# Patient Record
Sex: MALE | Race: WHITE | URBAN - METROPOLITAN AREA
[De-identification: names, ages, dates, MRNs, and addresses within clinical notes are randomized per-mention and may not be internally consistent; named-entity substitution may affect disease eponyms.]

---

## 2018-06-08 ENCOUNTER — OFFICE VISIT CONVERTED (OUTPATIENT)
Dept: UROLOGY | Facility: CLINIC | Age: 48
End: 2018-06-08
Attending: UROLOGY

## 2019-04-01 ENCOUNTER — HOSPITAL ENCOUNTER (OUTPATIENT)
Dept: URGENT CARE | Facility: CLINIC | Age: 49
Discharge: HOME | End: 2019-04-01

## 2019-08-20 ENCOUNTER — OFFICE VISIT CONVERTED (OUTPATIENT)
Dept: ORTHOPEDIC SURGERY | Facility: CLINIC | Age: 49
End: 2019-08-20
Attending: ORTHOPAEDIC SURGERY

## 2019-09-06 ENCOUNTER — OFFICE VISIT CONVERTED (OUTPATIENT)
Dept: ORTHOPEDIC SURGERY | Facility: CLINIC | Age: 49
End: 2019-09-06
Attending: ORTHOPAEDIC SURGERY

## 2019-09-30 ENCOUNTER — HOSPITAL ENCOUNTER (OUTPATIENT)
Dept: PERIOP | Facility: HOSPITAL | Age: 49
Setting detail: HOSPITAL OUTPATIENT SURGERY
Discharge: HOME | End: 2019-09-30
Attending: ORTHOPAEDIC SURGERY

## 2019-10-15 ENCOUNTER — OFFICE VISIT CONVERTED (OUTPATIENT)
Dept: ORTHOPEDIC SURGERY | Facility: CLINIC | Age: 49
End: 2019-10-15
Attending: ORTHOPAEDIC SURGERY

## 2019-11-15 ENCOUNTER — OFFICE VISIT CONVERTED (OUTPATIENT)
Dept: ORTHOPEDIC SURGERY | Facility: CLINIC | Age: 49
End: 2019-11-15
Attending: PHYSICIAN ASSISTANT

## 2020-01-02 ENCOUNTER — OFFICE VISIT CONVERTED (OUTPATIENT)
Dept: ORTHOPEDIC SURGERY | Facility: CLINIC | Age: 50
End: 2020-01-02
Attending: PHYSICIAN ASSISTANT

## 2020-02-27 ENCOUNTER — OFFICE VISIT CONVERTED (OUTPATIENT)
Dept: ORTHOPEDIC SURGERY | Facility: CLINIC | Age: 50
End: 2020-02-27
Attending: PHYSICIAN ASSISTANT

## 2020-04-23 ENCOUNTER — OFFICE VISIT CONVERTED (OUTPATIENT)
Dept: ORTHOPEDIC SURGERY | Facility: CLINIC | Age: 50
End: 2020-04-23
Attending: PHYSICIAN ASSISTANT

## 2020-06-18 ENCOUNTER — CONVERSION ENCOUNTER (OUTPATIENT)
Dept: ORTHOPEDIC SURGERY | Facility: CLINIC | Age: 50
End: 2020-06-18

## 2020-06-18 ENCOUNTER — OFFICE VISIT CONVERTED (OUTPATIENT)
Dept: ORTHOPEDIC SURGERY | Facility: CLINIC | Age: 50
End: 2020-06-18
Attending: PHYSICIAN ASSISTANT

## 2021-05-12 NOTE — PROGRESS NOTES
Progress Note      Patient Name: Esvin Lobo   Patient ID: [id number]   Sex: Male   YOB: 1970    Primary Care Provider: Nita BAILEY   Referring Provider: Nita BAILEY    Visit Date: April 23, 2020    Provider: Woo Zepeda PA-C   Location: [facility]   Location Address: [address] [place] [address]  [id number]   Location Phone: [phone]          Chief Complaint  · Left Shoulder pain      History Of Present Illness  Esvin Lobo is a 49 year old /White male who presents today to [facility]. Patient following up for evaluation of left shoulder arthroscopic subacromial decompression, mini open rotator cuff repair, 9/30/2019. This is a Workmen's Comp. case. Patient states he has improved since last visit, continues physical therapy, states he is around 85% better but still feels like he is not 100%. Patient has been working on strength with physical therapy, has more strength with pushing, weaker with pulling and patient states external rotation is more limited than internal rotation. Patient denies need for pain medication or anti-inflammatory medicines and has no new complaints of pain or injury at this time.       Past Medical History  ***No Significant Medical History; *No Pertinent Past Medical History         Past Surgical History  *Denies any surgical procedures; I have had no surgeries         Medication List  ibuprofen 200 mg oral capsule         Allergy List  NO KNOWN DRUG ALLERGIES; NO KNOWN DRUG ALLERGIES       Allergies Reconciled  Family Medical History  Cancer, Unspecified; Diabetes, unspecified type; *No Known Family History; Family history of Arthritis         Social History  Alcohol (Current some day); Alcohol Use (Current some day); lives with other; Recreational Drug Use (Never); Single.; Tobacco (Never); Working         Review of Systems  · Constitutional  o Denies  o : fever, chills, weight  "loss  · Cardiovascular  o Denies  o : chest pain, shortness of breath  · Gastrointestinal  o Denies  o : liver disease, heartburn, nausea, blood in stools  · Genitourinary  o Denies  o : painful urination, blood in urine  · Integument  o Denies  o : rash, itching  · Neurologic  o Denies  o : headache, weakness, loss of consciousness  · Musculoskeletal  o Denies  o : painful, swollen joints  · Psychiatric  o Denies  o : drug/alcohol addiction, anxiety, depression      Vitals  Date Time BP Position Site L\R Cuff Size HR RR TEMP (F) WT  HT  BMI kg/m2 BSA m2 O2 Sat        04/23/2020 09:49 AM      72 - R   222lbs 8oz 5'  11\" 31.03 2.25 95 %          Physical Examination  · Constitutional  o Appearance  o : well developed, well-nourished, no obvious deformities present  · Head and Face  o Head  o :   § Inspection  § : normocephalic  o Face  o :   § Inspection  § : no facial lesions  · Eyes  o Conjunctivae  o : conjunctivae normal  o Sclerae  o : sclerae white  · Ears, Nose, Mouth and Throat  o Ears  o :   § External Ears  § : appearance within normal limits  § Hearing  § : intact  o Nose  o :   § External Nose  § : appearance normal  · Neck  o Inspection/Palpation  o : normal appearance  o Range of Motion  o : full range of motion  · Respiratory  o Respiratory Effort  o : breathing unlabored  o Inspection of Chest  o : normal appearance  o Auscultation of Lungs  o : no audible wheezing or rales  · Cardiovascular  o Heart  o : regular rate  · Gastrointestinal  o Abdominal Examination  o : soft and non-tender  · Skin and Subcutaneous Tissue  o General Inspection  o : intact, no rashes  · Psychiatric  o General  o : Alert and oriented x3  o Judgement and Insight  o : judgment and insight intact  o Mood and Affect  o : mood normal, affect appropriate  · Left Shoulder  o Inspection  o : Incisions are well-healed, no redness, no swelling, no ecchymosis, no signs of infection. Forward elevation 165, abduction 95, external " rotation 75, internal rotation 80, 5 out of 5 rotator cuff strength, no pain with range of motion.          Assessment  · Aftercare following surgery of left shoulder arthroscopic subacromial decompression, mini open rotator cuff repair, 9/30/2019     V54.81  · Left shoulder pain, unspecified chronicity     719.41/M25.512      Plan  · Medications  o Medications have been Reconciled  o Transition of Care or Provider Policy  · Instructions  o Reviewed the patient's Past Medical, Social, and Family history as well as the ROS at today's visit, no changes.  o Call or return if worsening symptoms.  o Follow up in 6-8 weeks.  o Patient advised to continue work with light duty with lifting restrictions less than 15 pounds. Continue physical therapy new order was written. Follow-up in 6 to 8 weeks for final evaluation.            Electronically Signed by: RAKAN Christine-AUDELIA -Author on April 23, 2020 10:24:29 AM  Electronically Co-signed by: Rickie Lam MD -Reviewer on April 26, 2020 08:53:11 PM

## 2021-05-13 NOTE — PROGRESS NOTES
Progress Note      Patient Name: Esvin Lobo   Patient ID: [id number]   Sex: Male   YOB: 1970    Primary Care Provider: Nita BAILEY   Referring Provider: Nita BAILEY    Visit Date: June 18, 2020    Provider: Woo Zepeda PA-C   Location: [facility]   Location Address: [address] [place] [address]  [id number]   Location Phone: [phone]          Chief Complaint  · Left shoulder pain      History Of Present Illness  Esvin Lobo is a 49 year old /White male who presents today to [facility]. Patient presents for follow-up evaluation of left shoulder arthroscopic subacromial decompression, mini open rotator cuff repair, 9/30/2019. Patient states he is doing well, he is about to retire from the [employer], he states he has been continue to do physical therapy with good results. Patient denies difficulty with range of motion, denies trouble with strength of the shoulder, patient states he has occasional pain in the shoulder depending on certain movements or laying/sleeping on the shoulder. Patient has no difficulty with activities of daily living. Patient states he is ready to be released to full duty at work.       Past Medical History  ***No Significant Medical History; *No Pertinent Past Medical History         Past Surgical History  *Denies any surgical procedures; I have had no surgeries         Medication List  ibuprofen 200 mg oral capsule         Allergy List  NO KNOWN DRUG ALLERGIES; NO KNOWN DRUG ALLERGIES       Allergies Reconciled  Family Medical History  Cancer, Unspecified; Diabetes, unspecified type; *No Known Family History; Family history of Arthritis         Social History  Alcohol (Current some day); Alcohol Use (Current some day); lives with other; Recreational Drug Use (Never); Single.; Tobacco (Never); Working         Review of Systems  · Constitutional  o Denies  o : fever, chills, weight  "loss  · Cardiovascular  o Denies  o : chest pain, shortness of breath  · Gastrointestinal  o Denies  o : liver disease, heartburn, nausea, blood in stools  · Genitourinary  o Denies  o : painful urination, blood in urine  · Integument  o Denies  o : rash, itching  · Neurologic  o Denies  o : headache, weakness, loss of consciousness  · Musculoskeletal  o Denies  o : painful, swollen joints  · Psychiatric  o Denies  o : drug/alcohol addiction, anxiety, depression      Vitals  Date Time BP Position Site L\R Cuff Size HR RR TEMP (F) WT  HT  BMI kg/m2 BSA m2 O2 Sat        06/18/2020 10:06 AM      72 - R   221lbs 2oz 5'  11\" 30.84 2.24 96 %          Physical Examination  · Constitutional  o Appearance  o : well developed, well-nourished, no obvious deformities present  · Head and Face  o Head  o :   § Inspection  § : normocephalic  o Face  o :   § Inspection  § : no facial lesions  · Eyes  o Conjunctivae  o : conjunctivae normal  o Sclerae  o : sclerae white  · Ears, Nose, Mouth and Throat  o Ears  o :   § External Ears  § : appearance within normal limits  § Hearing  § : intact  o Nose  o :   § External Nose  § : appearance normal  · Neck  o Inspection/Palpation  o : normal appearance  o Range of Motion  o : full range of motion  · Respiratory  o Respiratory Effort  o : breathing unlabored  o Inspection of Chest  o : normal appearance  o Auscultation of Lungs  o : no audible wheezing or rales  · Cardiovascular  o Heart  o : regular rate  · Gastrointestinal  o Abdominal Examination  o : soft and non-tender  · Skin and Subcutaneous Tissue  o General Inspection  o : intact, no rashes  · Psychiatric  o General  o : Alert and oriented x3  o Judgement and Insight  o : judgment and insight intact  o Mood and Affect  o : mood normal, affect appropriate  · Left Shoulder  o Inspection  o : Incisions are well-healed, no redness, no swelling, no ecchymosis, no signs of infection. Forward elevation 170, abduction 110, external " rotation 75, internal rotation 80, 5 out of 5 rotator cuff strength, no pain with range of motion.           Assessment  · Aftercare following surgery of left shoulder arthroscopic subacromial decompression, mini open rotator cuff repair, 9/30/2019     V54.81  · Left shoulder pain, unspecified chronicity     719.41/M25.512      Plan  · Medications  o Medications have been Reconciled  o Transition of Care or Provider Policy  · Instructions  o Reviewed the patient's Past Medical, Social, and Family history as well as the ROS at today's visit, no changes.  o Call or return if worsening symptoms.  o Follow up as needed.  o Patient advised to continue activity as tolerated, given work release for full duty. Follow-up as needed.            Electronically Signed by: RAKAN Christine-AUDELIA -Author on June 18, 2020 11:40:57 AM  Electronically Co-signed by: Rickie Lam MD -Reviewer on June 18, 2020 10:14:53 PM

## 2021-05-15 VITALS — HEIGHT: 71 IN | BODY MASS INDEX: 29.4 KG/M2 | HEART RATE: 90 BPM | WEIGHT: 210 LBS | OXYGEN SATURATION: 97 %

## 2021-05-15 VITALS — HEART RATE: 71 BPM | WEIGHT: 222.5 LBS | HEIGHT: 71 IN | BODY MASS INDEX: 31.15 KG/M2 | OXYGEN SATURATION: 95 %

## 2021-05-15 VITALS — HEIGHT: 71 IN | BODY MASS INDEX: 30.96 KG/M2 | OXYGEN SATURATION: 96 % | HEART RATE: 71 BPM | WEIGHT: 221.12 LBS

## 2021-05-15 VITALS — BODY MASS INDEX: 30.31 KG/M2 | HEART RATE: 87 BPM | OXYGEN SATURATION: 98 % | WEIGHT: 216.5 LBS | HEIGHT: 71 IN

## 2021-05-15 VITALS — WEIGHT: 217 LBS | OXYGEN SATURATION: 96 % | HEIGHT: 71 IN | HEART RATE: 68 BPM | BODY MASS INDEX: 30.38 KG/M2

## 2021-05-15 VITALS — BODY MASS INDEX: 31.13 KG/M2 | OXYGEN SATURATION: 97 % | HEART RATE: 81 BPM | WEIGHT: 222.37 LBS | HEIGHT: 71 IN

## 2021-05-15 VITALS — BODY MASS INDEX: 30.45 KG/M2 | HEART RATE: 64 BPM | OXYGEN SATURATION: 96 % | WEIGHT: 217.5 LBS | HEIGHT: 71 IN

## 2021-05-16 VITALS — BODY MASS INDEX: 29.4 KG/M2 | HEIGHT: 71 IN | WEIGHT: 210 LBS | RESPIRATION RATE: 14 BRPM
